# Patient Record
Sex: MALE | Race: OTHER | NOT HISPANIC OR LATINO | URBAN - METROPOLITAN AREA
[De-identification: names, ages, dates, MRNs, and addresses within clinical notes are randomized per-mention and may not be internally consistent; named-entity substitution may affect disease eponyms.]

---

## 2023-08-31 ENCOUNTER — EMERGENCY (EMERGENCY)
Facility: HOSPITAL | Age: 38
LOS: 1 days | Discharge: HOME | End: 2023-08-31
Attending: EMERGENCY MEDICINE | Admitting: EMERGENCY MEDICINE
Payer: SELF-PAY

## 2023-08-31 VITALS
DIASTOLIC BLOOD PRESSURE: 72 MMHG | HEART RATE: 75 BPM | OXYGEN SATURATION: 100 % | RESPIRATION RATE: 18 BRPM | TEMPERATURE: 98 F | SYSTOLIC BLOOD PRESSURE: 111 MMHG

## 2023-08-31 VITALS
HEART RATE: 83 BPM | TEMPERATURE: 98 F | SYSTOLIC BLOOD PRESSURE: 116 MMHG | OXYGEN SATURATION: 98 % | DIASTOLIC BLOOD PRESSURE: 74 MMHG | RESPIRATION RATE: 20 BRPM

## 2023-08-31 DIAGNOSIS — B35.1 TINEA UNGUIUM: ICD-10-CM

## 2023-08-31 DIAGNOSIS — M79.671 PAIN IN RIGHT FOOT: ICD-10-CM

## 2023-08-31 DIAGNOSIS — Z59.00 HOMELESSNESS UNSPECIFIED: ICD-10-CM

## 2023-08-31 DIAGNOSIS — F17.210 NICOTINE DEPENDENCE, CIGARETTES, UNCOMPLICATED: ICD-10-CM

## 2023-08-31 DIAGNOSIS — M79.672 PAIN IN LEFT FOOT: ICD-10-CM

## 2023-08-31 PROCEDURE — 99283 EMERGENCY DEPT VISIT LOW MDM: CPT

## 2023-08-31 SDOH — ECONOMIC STABILITY - HOUSING INSECURITY: HOMELESSNESS UNSPECIFIED: Z59.00

## 2023-08-31 NOTE — ED ADULT NURSE NOTE - CAS ELECT INFOMATION PROVIDED
DC instructions provided list for shelter facilities; pt refused vital signs and [employer]/DC instructions/Other

## 2023-08-31 NOTE — ED PROVIDER NOTE - PATIENT PORTAL LINK FT
You can access the FollowMyHealth Patient Portal offered by [facility] by registering at the following website: [email or web address] By joining [facility]’s FollowMyHealth portal, you will also be able to view your health information using other applications (apps) compatible with our system.

## 2023-08-31 NOTE — ED ADULT NURSE NOTE - OBJECTIVE STATEMENT
pt initially reports bilateral feet pain ongoing for 1 month then states needing a place to sleep; pt able to ambulate independently without incident; no obvious injuries/open wounds on bilateral feet

## 2023-08-31 NOTE — ED PROVIDER NOTE - OBJECTIVE STATEMENT
37-year old M with no PMHx presenting to the E.D. requesting a place to sleep.  Patient told EMS that he was having feet pain x 1 month but patient admits that this is not true.  Patient relates that he is homeless and has been living in [facility].

## 2023-08-31 NOTE — ED PROVIDER NOTE - PHYSICAL EXAMINATION
Gen: well appearing, NAD, malodorous  HEENT: no stridor, mucous membranes moist  CV: regular rate and rhythm  Lungs: no resp distress, nml effort  Abd: nondistended, flat  Ext: no gross deformities, no peripheral edema, onychomycosis of feet bilaterally, dirty   Neuro: A&Ox3, moving all extremities symmetrically, nml gait  Skin: no rash, no pallor  Psych: not responding to internal stimuli, appropriate affect

## 2023-08-31 NOTE — ED ADULT NURSE NOTE - NSFALLUNIVINTERV_ED_ALL_ED
Bed/Stretcher in lowest position, wheels locked, appropriate side rails in place/Call bell, personal items and telephone in reach/Instruct patient to call for assistance before getting out of bed/chair/stretcher/Non-slip footwear applied when patient is off stretcher/[place] to call system/Physically safe environment - no spills, clutter or unnecessary equipment/Purposeful proactive rounding/Room/bathroom lighting operational, light cord in reach

## 2023-08-31 NOTE — ED PROVIDER NOTE - CLINICAL SUMMARY MEDICAL DECISION MAKING FREE TEXT BOX
A/P: 37-year old homeless M presenting to E.D. seeking shelter  --No acute medical or psychiatric emergency at this time  --Plan to discharge patient with shelter information

## 2023-08-31 NOTE — ED ADULT NURSE NOTE - CAS DISCH TRANSFER METHOD
Use Motrin up to 3 times a day as needed for pain  You can supplement with Tylenol also wear the ankle air splint whenever you are ambulating for the next 2 weeks  Follow-up with a PCP and return to the ED as needed   Walking

## 2023-08-31 NOTE — ED ADULT TRIAGE NOTE - CHIEF COMPLAINT QUOTE
BIBEMS from [facility] for bilateral foot pain x 1 month. able to ambulate